# Patient Record
Sex: MALE | Employment: FULL TIME | URBAN - METROPOLITAN AREA
[De-identification: names, ages, dates, MRNs, and addresses within clinical notes are randomized per-mention and may not be internally consistent; named-entity substitution may affect disease eponyms.]

---

## 2018-01-11 ENCOUNTER — HOSPITAL ENCOUNTER (OUTPATIENT)
Dept: RADIOLOGY | Facility: HOSPITAL | Age: 23
Discharge: HOME | End: 2018-01-11
Attending: ORTHOPAEDIC SURGERY

## 2018-01-11 ENCOUNTER — OFFICE VISIT (OUTPATIENT)
Dept: SPORTS MEDICINE | Facility: CLINIC | Age: 23
End: 2018-01-11

## 2018-01-11 VITALS — WEIGHT: 207 LBS | BODY MASS INDEX: 24.44 KG/M2 | HEIGHT: 77 IN

## 2018-01-11 DIAGNOSIS — S80.11XA CONTUSION OF RIGHT LOWER LEG, INITIAL ENCOUNTER: ICD-10-CM

## 2018-01-11 DIAGNOSIS — M79.671 RIGHT FOOT PAIN: ICD-10-CM
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
## 2018-01-11 DIAGNOSIS — M79.661 PAIN OF RIGHT LOWER LEG: Primary | ICD-10-CM

## 2018-01-11 DIAGNOSIS — S96.911A OVERUSE SYNDROME OF FOOT, RIGHT, INITIAL ENCOUNTER: ICD-10-CM

## 2018-01-11 DIAGNOSIS — M79.661 PAIN OF RIGHT LOWER LEG: ICD-10-CM

## 2018-01-11 PROCEDURE — 73590 X-RAY EXAM OF LOWER LEG: CPT | Mod: TC,FY,PO,RT

## 2018-01-11 PROCEDURE — 73590 X-RAY EXAM OF LOWER LEG: CPT | Mod: 26,RT,, | Performed by: RADIOLOGY

## 2018-01-11 PROCEDURE — 99204 OFFICE O/P NEW MOD 45 MIN: CPT | Mod: S$PBB,,, | Performed by: ORTHOPAEDIC SURGERY

## 2018-01-11 PROCEDURE — 99203 OFFICE O/P NEW LOW 30 MIN: CPT | Mod: PBBFAC,25,PO | Performed by: ORTHOPAEDIC SURGERY

## 2018-01-11 PROCEDURE — 99999 PR PBB SHADOW E&M-NEW PATIENT-LVL III: CPT | Mod: PBBFAC,,, | Performed by: ORTHOPAEDIC SURGERY

## 2018-01-11 PROCEDURE — 73630 X-RAY EXAM OF FOOT: CPT | Mod: TC,FY,PO,RT

## 2018-01-11 PROCEDURE — 73630 X-RAY EXAM OF FOOT: CPT | Mod: 26,RT,, | Performed by: RADIOLOGY

## 2018-01-11 NOTE — Clinical Note
January 11, 2018      [facility]            [facility]  [address]  [facility] [address]  Phone: [phone]          Patient: Jose Mckenzie   MR Number: [id number]   YOB: 1995   Date of Visit: 1/11/2018       Dear [facility] :    Thank you for referring Jose Mckenzie to me for evaluation. Attached you will find relevant portions of my assessment and plan of care.    If you have questions, please do not hesitate to call me. I look forward to following Jose Mckenzie along with you.    Sincerely,    ELIZABETH Walton MD    Enclosure  CC:  No Recipients    If you would like to receive this communication electronically, please contact [email or web address] or [phone] to request more information on "[employer] Link access.    For providers and/or their staff who would like to refer a patient to [facility], please contact us through our one-stop-shop provider referral line, [facility], at [phone].    If you feel you have received this communication in error or would no longer like to receive these types of communications, please e-mail [email or web address]

## 2018-01-11 NOTE — PROGRESS NOTES
CC: Right lower leg and foot pain    23 y.o. Male who presents as a new patient to me. 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  Accompanied by the team  today.  Complaint is right proximal fibular and lateral leg pain x 1 day. CHAVA: blunt force trauma into the lateral lower leg from another player's knee during collision. Transient tingling/numbness sensation down peroneal distribution after the direct trauma.  Patient able to play through the injury without performance limitation.  Experienced some soreness and pain after the game but no recurrent neurogenic complaints.  Again localizes proximally over the fibular head and neck and surrounding soft tissues.  He also reports a 1 day history of swelling and mild discomfort over the dorsolateral aspect of his right foot.  No associated injury mechanism reported.  Not noticed in last night's game. He participated in the shoot around this morning without significant foot complaint.  He did have some mild to moderate pain over lower leg injury (4/10 at its worst). Better with rest, ice.  History of prior ankle sprains on the right side but otherwise no significant trauma.    REVIEW OF SYSTEMS:   Constitution: Negative. Negative for chills, fever and night sweats.    Hematologic/Lymphatic: Negative for bleeding problem. Does not bruise/bleed easily.   Skin: Negative for dry skin, itching and rash.   Musculoskeletal: Negative for falls. Positive for right lower leg and foot pain    PAST MEDICAL HISTORY:   No past medical history on file.    PAST SURGICAL HISTORY:   No past surgical history on file.    FAMILY HISTORY:   No family history on file.    SOCIAL HISTORY:   Social History     Social History    Marital status: Unknown     Spouse name: N/A    Number of children: N/A    Years of education: N/A     Occupational History    Not on file.     Social History Main Topics    Smoking status: Not on file    Smokeless  "tobacco: Not on file    Alcohol use Not on file    Drug use: Unknown    Sexual activity: Not on file     Other Topics Concern    Not on file     Social History Narrative    No narrative on file       MEDICATIONS:   No current outpatient prescriptions on file.    ALLERGIES:   Review of patient's allergies indicates:  Allergies not on file     PHYSICAL EXAMINATION:  Ht 6' 5" (1.956 m)   Wt 93.9 kg (207 lb)   BMI 24.55 kg/m²   General: Well-developed well-nourished 23 y.o. male in no acute distress   Cardiovascular: Regular rhythm by palpation of distal pulse, normal color and temperature, no concerning varicosities on symptomatic side   Lungs: No labored breathing or wheezing appreciated   Neuro: Alert and oriented ×3   Psychiatric: well oriented to person, place and time, demonstrates normal mood and affect   Skin: No rashes, lesions or ulcers, normal temperature, turgor, and texture on involved extremity    Ortho/SPM Exam   Bilateral standing genu varum, physiologic    Right knee and lower extremity exam demonstrates no effusion.  Intact extensor mechanism.  Negative Lachman.  Negative posterior drawer.  Stable to varus and valgus stress at 0 and 30°.  No pain to varus stress.  Very small ecchymosis over the proximal lateral aspect of the lower leg with associated soft tissue tenderness.  Moderate discomfort with Tinel test over the peroneal nerve.  No distal neurogenic complaints.  No significant pain with resisted ankle dorsiflexion and plantarflexion.  No significant tenderness over the proximal fibular head and biceps femoris insertion.  No tenderness over the knee joint lines.    Right ankle and foot exam demonstrates no swelling or tenderness over the ankle.  No tenderness specifically over the fifth metatarsal base.  Minimal tenderness over the dorsal lateral midfoot at the level of the cuboid.  Noted mild swelling over the dorsolateral midfoot compared to the contralateral side.  Minimal discomfort " with midfoot stress test.  Nontender over the navicular and LisFranc joint.  Mild discomfort with resisted EDC function.    IMAGING:    X-rays including AP and lateral views of the right tibia and fibula were ordered and reviewed by me demonstrating no evidence of fracture or osseous abnormality otherwise.    X-rays including AP lateral and oblique views of the right foot were ordered and reviewed by me demonstrating no abnormality over the cuboid. No fracture or acute change.      ASSESSMENT:      ICD-10-CM ICD-9-CM   1. Pain of right lower leg M79.661 729.5   2. Right foot pain M79.671 729.5   3. Contusion of right foot, initial encounter S90.31XA 924.20   4. Contusion of right lower leg, initial encounter S80.11XA 924.10         PLAN:     No fractures appreciated. Contusion injury to proximal lower leg from blunt force trauma. Mild tenderness on exam.  Ligamentously stable.  No peroneal nerve dysfunction. Recommendation is ice, oral NSAID's, and local padding during play. Participate to tolerance.  If doesn't improve in a timely fashion or restricts level of play, next step is MRI to rule out stress fracture.    Overuse injury to right foot.  Symptoms ×1 day.  X-rays negative.  Discussed conservative treatment measures.  If pain becomes significant or restricts level of play, next step is MRI.    Patient has my card and contact information.       Procedures

## 2018-07-30 DIAGNOSIS — Z02.5 SPORTS PHYSICAL: Primary | ICD-10-CM

## 2018-08-01 ENCOUNTER — IMAGING SERVICES (OUTPATIENT)
Dept: CV DIAGNOSTICS | Age: 23
End: 2018-08-01
Attending: INTERNAL MEDICINE

## 2018-08-01 DIAGNOSIS — Z02.5 SPORTS PHYSICAL: ICD-10-CM

## 2018-08-01 LAB
ASCENDING AORTA (AAD): 3.1 CM
AV MEAN GRADIENT (AVMG): 2.4 MMHG
AV PEAK GRADIENT (AVPG): 5 MMHG
AV PEAK VELOCITY (AVPV): 1.1 M/S
DOP CALC LVOT PEAK VEL (LVOTPV): 1 M/S
E WAVE DECELARATION TIME (MDT): 157.4 MS
INTERVENTRICULAR SEPTUM IN END DIASTOLE (IVSD): 0.9 CM
LEFT INTERNAL DIMENSION IN SYSTOLE (LVSD): 3.6 CM
LEFT VENTRICULAR INTERNAL DIMENSION IN DIASTOLE (LVDD): 5.4 CM
LEFT VENTRICULAR POSTERIOR WALL IN END DIASTOLE (LVPW): 0.9 CM
LV EF: 60 %
LVOT 2D (LVOTD): 2.5 CM
LVOT VTI (LVOTVTI): 25 CM
MV E TISSUE VEL LAT (MELV): 18.6 CM/S
MV E TISSUE VEL MED (MESV): 12.7 CM/S
MV E WAVE VEL/E TISSUE VEL MED(MSR): 7.1
MV PEAK A VELOCITY (MVPAV): 0.3 M/S
MV PEAK E VELOCITY (MVPEV): 0.9 M/S
RV TISSUE DOPPLER FREE WALL HEART RATE (RVSTV): 0.2 M/S
SINUSES OF VALSALVA (SVD): 3.5 CM
TV ESTIMATED RIGHT ARTERIAL PRESSURE (RAP): 8 MMHG

## 2018-08-01 PROCEDURE — 93351 STRESS TTE COMPLETE: CPT | Performed by: INTERNAL MEDICINE

## 2018-08-01 PROCEDURE — 93306 TTE W/DOPPLER COMPLETE: CPT | Performed by: INTERNAL MEDICINE

## 2019-08-30 DIAGNOSIS — Z02.5 SPORTS PHYSICAL: Primary | ICD-10-CM

## 2019-09-03 ENCOUNTER — IMAGING SERVICES (OUTPATIENT)
Dept: CV DIAGNOSTICS | Age: 24
End: 2019-09-03
Attending: INTERNAL MEDICINE

## 2019-09-03 DIAGNOSIS — Z02.5 SPORTS PHYSICAL: ICD-10-CM

## 2019-09-03 LAB
ASCENDING AORTA (AAD): 3.2 CM
AV PEAK GRADIENT (AVPG): 5.3 MMHG
AV PEAK VELOCITY (AVPV): 1.2 M/S
DOP CALC LVOT PEAK VEL (LVOTPV): 0.9 M/S
E WAVE DECELARATION TIME (MDT): 243.1 MS
INTERVENTRICULAR SEPTUM IN END DIASTOLE (IVSD): 0.9 CM
LEFT INTERNAL DIMENSION IN SYSTOLE (LVSD): 3.9 CM
LEFT VENTRICULAR INTERNAL DIMENSION IN DIASTOLE (LVDD): 5.5 CM
LEFT VENTRICULAR POSTERIOR WALL IN END DIASTOLE (LVPW): 0.9 CM
LV EF: 52 %
LVOT 2D (LVOTD): 2.6 CM
LVOT VTI (LVOTVTI): 18 CM
MV E TISSUE VEL LAT (MELV): 19.3 CM/S
MV E TISSUE VEL MED (MESV): 16.8 CM/S
MV E WAVE VEL/E TISSUE VEL MED(MSR): 4.6
MV PEAK A VELOCITY (MVPAV): 0.5 M/S
MV PEAK E VELOCITY (MVPEV): 0.8 M/S
PV PEAK VELOCITY (PVPV): 1 M/S
RV TISSUE DOPPLER FREE WALL HEART RATE (RVSTV): 0.1 M/S
SINUSES OF VALSALVA (SVD): 3.7 CM
TV ESTIMATED RIGHT ARTERIAL PRESSURE (RAP): 8 MMHG

## 2019-09-03 PROCEDURE — 93351 STRESS TTE COMPLETE: CPT | Performed by: INTERNAL MEDICINE

## 2019-09-03 PROCEDURE — 93306 TTE W/DOPPLER COMPLETE: CPT | Performed by: INTERNAL MEDICINE

## 2020-07-25 ENCOUNTER — IMAGING SERVICES (OUTPATIENT)
Dept: CV DIAGNOSTICS | Age: 25
End: 2020-07-25
Attending: INTERNAL MEDICINE

## 2020-07-25 ENCOUNTER — LAB SERVICES (OUTPATIENT)
Dept: LAB | Age: 25
End: 2020-07-25
Attending: INTERNAL MEDICINE

## 2020-07-25 ENCOUNTER — APPOINTMENT (OUTPATIENT)
Dept: CARDIOLOGY | Age: 25
End: 2020-07-25
Attending: INTERNAL MEDICINE

## 2020-07-25 DIAGNOSIS — Z02.5 SPORTS PHYSICAL: ICD-10-CM

## 2020-07-25 DIAGNOSIS — Z02.5 SPORTS PHYSICAL: Primary | ICD-10-CM

## 2020-07-25 LAB — TROPONIN I SERPL HS-MCNC: <0.02 NG/ML

## 2020-07-25 PROCEDURE — 93306 TTE W/DOPPLER COMPLETE: CPT | Performed by: INTERNAL MEDICINE

## 2020-07-25 PROCEDURE — 84484 ASSAY OF TROPONIN QUANT: CPT

## 2020-07-25 PROCEDURE — 36415 COLL VENOUS BLD VENIPUNCTURE: CPT

## 2020-07-27 DIAGNOSIS — Z02.5 SPORTS PHYSICAL: Primary | ICD-10-CM

## 2020-07-28 LAB
AORTIC VALVE AREA: 2.8 CM2
ASCENDING AORTA (AAD): 3.1 CM
AV MEAN GRADIENT (AVMG): 3.9 MMHG
AV PEAK GRADIENT (AVPG): 7 MMHG
AV PEAK VELOCITY (AVPV): 1.3 M/S
DOP CALC LVOT PEAK VEL (LVOTPV): 1 M/S
E WAVE DECELARATION TIME (MDT): 201.4 MS
EST RIGHT VENT SYSTOLIC PRESSURE BY TRICUSPID REGURGITATION JET (RVSP): 15.6 MMHG
INTERVENTRICULAR SEPTUM IN END DIASTOLE (IVSD): 1 CM
LEFT INTERNAL DIMENSION IN SYSTOLE (LVSD): 4.1 CM
LEFT VENTRICULAR INTERNAL DIMENSION IN DIASTOLE (LVDD): 5.9 CM
LEFT VENTRICULAR POSTERIOR WALL IN END DIASTOLE (LVPW): 0.9 CM
LV EF: 56 %
LV END SYSTOLIC LONGITUDINAL STRAIN GLOBAL (LVGS): -15.2 %
LVOT 2D (LVOTD): 2.2 CM
LVOT VTI (LVOTVTI): 21.7 CM
MV E TISSUE VEL LAT (MELV): 14.1 CM/S
MV E TISSUE VEL MED (MESV): 14.5 CM/S
MV E WAVE VEL/E TISSUE VEL MED(MSR): 5.7
MV PEAK A VELOCITY (MVPAV): 0.7 M/S
MV PEAK E VELOCITY (MVPEV): 0.8 M/S
RV TISSUE DOPPLER FREE WALL HEART RATE (RVSTV): 0.2 M/S
SINUSES OF VALSALVA (SVD): 3.4 CM
TRICUSPID VALVE PEAK REGURGITATION VELOCITY (TRPV): 1.8 M/S
TV ESTIMATED RIGHT ARTERIAL PRESSURE (RAP): 3 MMHG

## 2020-08-25 DIAGNOSIS — Z02.5 SPORTS PHYSICAL: ICD-10-CM

## 2021-01-28 DIAGNOSIS — Z20.822 ENCOUNTER FOR LABORATORY TESTING FOR COVID-19 VIRUS: ICD-10-CM

## 2021-12-17 ENCOUNTER — LAB VISIT (OUTPATIENT)
Dept: LAB | Facility: HOSPITAL | Age: 26
End: 2021-12-17

## 2021-12-17 DIAGNOSIS — Z20.822 ENCOUNTER FOR LABORATORY TESTING FOR COVID-19 VIRUS: ICD-10-CM

## 2021-12-17 LAB — SARS-COV-2 RNA RESP QL NAA+PROBE: NOT DETECTED

## 2021-12-17 PROCEDURE — U0003 INFECTIOUS AGENT DETECTION BY NUCLEIC ACID (DNA OR RNA); SEVERE ACUTE RESPIRATORY SYNDROME CORONAVIRUS 2 (SARS-COV-2) (CORONAVIRUS DISEASE [COVID-19]), AMPLIFIED PROBE TECHNIQUE, MAKING USE OF HIGH THROUGHPUT TECHNOLOGIES AS DESCRIBED BY CMS-2020-01-R: HCPCS
